# Patient Record
Sex: FEMALE | Race: WHITE
[De-identification: names, ages, dates, MRNs, and addresses within clinical notes are randomized per-mention and may not be internally consistent; named-entity substitution may affect disease eponyms.]

---

## 2017-05-14 ENCOUNTER — HOSPITAL ENCOUNTER (INPATIENT)
Dept: HOSPITAL 17 - NEPI | Age: 22
LOS: 1 days | Discharge: HOME | DRG: 494 | End: 2017-05-15
Attending: ORTHOPAEDIC SURGERY | Admitting: ORTHOPAEDIC SURGERY
Payer: COMMERCIAL

## 2017-05-14 VITALS
RESPIRATION RATE: 16 BRPM | OXYGEN SATURATION: 100 % | DIASTOLIC BLOOD PRESSURE: 58 MMHG | HEART RATE: 89 BPM | SYSTOLIC BLOOD PRESSURE: 108 MMHG

## 2017-05-14 VITALS
HEART RATE: 92 BPM | RESPIRATION RATE: 17 BRPM | DIASTOLIC BLOOD PRESSURE: 77 MMHG | OXYGEN SATURATION: 99 % | TEMPERATURE: 99.8 F | SYSTOLIC BLOOD PRESSURE: 121 MMHG

## 2017-05-14 VITALS — BODY MASS INDEX: 39.9 KG/M2 | HEIGHT: 66 IN | WEIGHT: 248.24 LBS

## 2017-05-14 VITALS — OXYGEN SATURATION: 98 %
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
## 2017-05-14 DIAGNOSIS — S82.851A: Primary | ICD-10-CM
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
## 2017-05-14 LAB
APTT BLD: 23.3 SEC (ref 24.3–30.1)
BASOPHILS # BLD AUTO: 0.1 TH/MM3 (ref 0–0.2)
BASOPHILS NFR BLD: 0.4 % (ref 0–2)
EOSINOPHIL # BLD: 0.1 TH/MM3 (ref 0–0.4)
EOSINOPHIL NFR BLD: 0.5 % (ref 0–4)
ERYTHROCYTE [DISTWIDTH] IN BLOOD BY AUTOMATED COUNT: 14.5 % (ref 11.6–17.2)
HCT VFR BLD CALC: 36.4 % (ref 35–46)
HEMO FLAGS: (no result)
I-STAT POTASSIUM: 4.2 MMOL/L (ref 3.5–4.9)
I-STAT SODIUM: 140 MMOL/L (ref 138–146)
INR PPP: 1 RATIO
LYMPHOCYTES # BLD AUTO: 1.5 TH/MM3 (ref 1–4.8)
LYMPHOCYTES NFR BLD AUTO: 10.5 % (ref 9–44)
MCH RBC QN AUTO: 26.4 PG (ref 27–34)
MCHC RBC AUTO-ENTMCNC: 33.3 % (ref 32–36)
MCV RBC AUTO: 79.3 FL (ref 80–100)
MONOCYTES NFR BLD: 4.1 % (ref 0–8)
NEUTROPHILS # BLD AUTO: 12 TH/MM3 (ref 1.8–7.7)
NEUTROPHILS NFR BLD AUTO: 84.5 % (ref 16–70)
PLATELET # BLD: 290 TH/MM3 (ref 150–450)
PROTHROMBIN TIME: 10.5 SEC (ref 9.8–11.6)
RBC # BLD AUTO: 4.59 MIL/MM3 (ref 4–5.3)
WBC # BLD AUTO: 14.2 TH/MM3 (ref 4–11)

## 2017-05-14 PROCEDURE — 0SSFXZZ REPOSITION RIGHT ANKLE JOINT, EXTERNAL APPROACH: ICD-10-PCS | Performed by: EMERGENCY MEDICINE

## 2017-05-14 PROCEDURE — 82435 ASSAY OF BLOOD CHLORIDE: CPT

## 2017-05-14 PROCEDURE — 82565 ASSAY OF CREATININE: CPT

## 2017-05-14 PROCEDURE — 27840 TREAT ANKLE DISLOCATION: CPT

## 2017-05-14 PROCEDURE — 84295 ASSAY OF SERUM SODIUM: CPT

## 2017-05-14 PROCEDURE — 94150 VITAL CAPACITY TEST: CPT

## 2017-05-14 PROCEDURE — 85025 COMPLETE CBC W/AUTO DIFF WBC: CPT

## 2017-05-14 PROCEDURE — 86900 BLOOD TYPING SEROLOGIC ABO: CPT

## 2017-05-14 PROCEDURE — C1713 ANCHOR/SCREW BN/BN,TIS/BN: HCPCS

## 2017-05-14 PROCEDURE — 86850 RBC ANTIBODY SCREEN: CPT

## 2017-05-14 PROCEDURE — 84520 ASSAY OF UREA NITROGEN: CPT

## 2017-05-14 PROCEDURE — 73600 X-RAY EXAM OF ANKLE: CPT

## 2017-05-14 PROCEDURE — 76000 FLUOROSCOPY <1 HR PHYS/QHP: CPT

## 2017-05-14 PROCEDURE — 85730 THROMBOPLASTIN TIME PARTIAL: CPT

## 2017-05-14 PROCEDURE — 86901 BLOOD TYPING SEROLOGIC RH(D): CPT

## 2017-05-14 PROCEDURE — G0390 TRAUMA RESPONS W/HOSP CRITI: HCPCS

## 2017-05-14 PROCEDURE — E0113 CRUTCH UNDERARM EACH WOOD: HCPCS

## 2017-05-14 PROCEDURE — 82947 ASSAY GLUCOSE BLOOD QUANT: CPT

## 2017-05-14 PROCEDURE — 99291 CRITICAL CARE FIRST HOUR: CPT

## 2017-05-14 PROCEDURE — 84132 ASSAY OF SERUM POTASSIUM: CPT

## 2017-05-14 PROCEDURE — 85610 PROTHROMBIN TIME: CPT

## 2017-05-14 RX ADMIN — MORPHINE SULFATE PRN MG: 2 INJECTION, SOLUTION INTRAMUSCULAR; INTRAVENOUS at 22:48

## 2017-05-14 RX ADMIN — MORPHINE SULFATE PRN MG: 2 INJECTION, SOLUTION INTRAMUSCULAR; INTRAVENOUS at 20:32

## 2017-05-14 RX ADMIN — MORPHINE SULFATE PRN MG: 2 INJECTION, SOLUTION INTRAMUSCULAR; INTRAVENOUS at 18:00

## 2017-05-14 NOTE — RADRPT
EXAM DATE/TIME:  05/14/2017 13:46 

 

[facility] COMPARISON:     

No previous studies available for comparison.

 

                     

INDICATIONS :     

Trauma alert. Right ankle pain.

                     

 

MEDICAL HISTORY :     

None.          

 

SURGICAL HISTORY :     

None.   

 

ENCOUNTER:     

Initial                                        

 

ACUITY:     

1 day      

 

PAIN SCORE:     

5/10

 

LOCATION:     

Right  ankle.

 

FINDINGS:     

Two view examination was performed of the right ankle. There are bimalleolar displaced fractures with
 dislocation the ankle mortise.  No radiopaque foreign bodies are seen.  Bony mineralization is naomie
l.

 

CONCLUSION:     

Bimalleolar fractures with dislocation.

 

 

 

 

 Roddy Cole MD on May 14, 2017 at 14:33           

Board Certified Radiologist.

 This report was verified electronically.

## 2017-05-14 NOTE — RADRPT
EXAM DATE/TIME:  05/14/2017 13:59 

 

[facility] COMPARISON:     

No previous studies available for comparison.

 

                     

INDICATIONS :     

Trauma alert. Post reduction right ankle.

                     

 

MEDICAL HISTORY :     

None.          

 

SURGICAL HISTORY :     

None.   

 

ENCOUNTER:     

Initial                                        

 

ACUITY:     

1 day      

 

PAIN SCORE:     

5/10

 

LOCATION:     

Right  ankle.

 

FINDINGS:     

Two view examination was performed of the right ankle. Postreduction splinted views demonstrates sign
ificant improvement in alignment. Bimalleolar fractures with mild displacement. Ankle mortise near-an
atomic.  

 

CONCLUSION:     

Postreduction splinted views with significant improvement in alignment.

 

 

 

 

 Roddy Cole MD on May 14, 2017 at 14:34           

Board Certified Radiologist.

 This report was verified electronically.

## 2017-05-14 NOTE — PD.CAR.PN
CVT Progress Note


Subjective/Hospital Course:


Patient is a 22-year-old female who slid down a incline obstacle course wall 

from about 10 feet while holding onto a rope.


Incline was such that the patient actually didn't fall but slipped down the 

slippery obstacle and landed on her right ankle


For some reason this was called as a trauma alert but by any mechanism of 

injury or injuries present he did not qualify for the same.


Patient has isolated closed right ankle fracture and splint is applied


Physical examination reveals right foot in a splint warm well perfused with 

normal capillary refill


No vascular or neurologic deficit


Patient is downgraded to regular ER evaluation and care


Thanks


Isaías Arredondo MD May 14, 2017 14:35

## 2017-05-14 NOTE — PD
HPI


Chief Complaint:  Trauma (Alert)


Time Seen by Provider:  13:58


Travel History


International Travel<30 days:  No


Contact w/Intl Traveler<30days:  No


Traveled to known affect area:  No





History of Present Illness


HPI


Young female patient was brought in as a trauma alert.  I was present in the 

room prior to her arrival.  Patient was in an obstacle course climbing a 

slanted wall when she lost control and slid on the wall.  She landed on the 

ground awkwardly on her right ankle/foot twisted.  She was unable to stand up 

and was on severe pain.  Initially when paramedics arrived they could not feel 

a pulse.  Her initial blood pressure was in the 90s.  They called a trauma 

alert based on those 2 criteria.  She did not hit her head and did not lose 

consciousness.  No other injuries.  She was awake, GCS of 15 and 

hemodynamically stable.  Otherwise a healthy patient she said.





[facility]


Past Medical History


*** Narrative Medical


No significant past medical history.


Medical History:  Denies Significant Hx





Past Surgical History


Cholecystectomy:  Yes





Social History


Tobacco Use:  No





Allergies-Medications


(Allergen,Severity, Reaction):  


Coded Allergies:  


     Penicillin (Verified  Allergy, Unknown, 5/14/17)


Comments


Unknown


Reported Meds & Prescriptions








Reported Meds & Active Scripts


Active








Narrative Medication


Unknown





Review of Systems


Except as stated in HPI:  all other systems reviewed are Neg





Physical Exam


Narrative


GENERAL: Awake, alert, obese, mild distress


SKIN: Focused skin assessment warm/dry.  Covered in drive moderate


HEAD: Atraumatic. Normocephalic. 


EYES: Pupils equal and round. No scleral icterus. No injection or drainage. 


ENT: No nasal bleeding or discharge.  Mucous membranes pink and moist.


NECK: Trachea midline. No JVD. 


CARDIOVASCULAR: Regular rate and rhythm.  No murmur appreciated.


RESPIRATORY: No accessory muscle use. Clear to auscultation. Breath sounds 

equal bilaterally. 


GASTROINTESTINAL: Abdomen soft, non-tender, nondistended. Hepatic and splenic 

margins not palpable. 


MUSCULOSKELETAL: Right ankle deformity.  No palpable pulse but dopplerable 

pulses present.  Distal neurovascular intact.  No clubbing.  No cyanosis.  No 

edema. 


NEUROLOGICAL: Awake and alert. No obvious cranial nerve deficits.  Motor 

grossly within normal limits. Normal speech.


PSYCHIATRIC: Appropriate mood and affect; insight and judgment normal.





Data


Data


Last Documented VS








Vital Signs








  Date Time  Temp Pulse Resp B/P Pulse Ox O2 Delivery O2 Flow Rate FiO2


 


5/14/17 14:41     98 Nasal Cannula 2.00 











Orders





 Ed Poc Ultrasound (5/14/17 )


I-Stat Profile (5/14/17 14:04)


I-Stat Creatinine (5/14/17 14:04)


Complete Blood Count With Diff (5/14/17 14:04)


Prothrombin Time / Inr (Pt) (5/14/17 14:04)


Act Partial Throm Time (Ptt) (5/14/17 14:04)


Type And Screen (5/14/17 14:04)


Iv Access Insert/Monitor (5/14/17 14:04)


Ecg Monitoring (5/14/17 14:04)


Oximetry (5/14/17 14:04)


Oxygen Administration (5/14/17 14:04)


Ankle, Limited (Ap&Lat) (5/14/17 )


Ankle, Limited (Ap&Lat) (5/14/17 )


Fiberglass Short Leg Splint Ad (5/14/17 )


Fiberglass Sugartong Sp Ad Sl (5/14/17 )


Admit Order (Ed Use Only) (5/14/17 14:51)





Labs





 Laboratory Tests








Test 5/14/17





 14:19


 


White Blood Count 14.2 TH/MM3


 


Red Blood Count 4.59 MIL/MM3


 


Hemoglobin 12.1 GM/DL


 


Bedside Hemoglobin 12.6 G/DL


 


Hematocrit 36.4 %


 


Bedside Hematocrit 37.0 %


 


Mean Corpuscular Volume 79.3 FL


 


Mean Corpuscular Hemoglobin 26.4 PG


 


Mean Corpuscular Hemoglobin 33.3 %





Concent 


 


Red Cell Distribution Width 14.5 %


 


Platelet Count 290 TH/MM3


 


Mean Platelet Volume 8.9 FL


 


Neutrophils (%) (Auto) 84.5 %


 


Lymphocytes (%) (Auto) 10.5 %


 


Monocytes (%) (Auto) 4.1 %


 


Eosinophils (%) (Auto) 0.5 %


 


Basophils (%) (Auto) 0.4 %


 


Neutrophils # (Auto) 12.0 TH/MM3


 


Lymphocytes # (Auto) 1.5 TH/MM3


 


Monocytes # (Auto) 0.6 TH/MM3


 


Eosinophils # (Auto) 0.1 TH/MM3


 


Basophils # (Auto) 0.1 TH/MM3


 


CBC Comment DIFF FINAL 


 


Differential Comment  


 


Prothrombin Time 10.5 SEC


 


Prothromb Time International 1.0 RATIO





Ratio 


 


Activated Partial 23.3 SEC





Thromboplast Time 


 


Bedside Sodium 140 MMOL/L


 


Bedside Potassium 4.2 MMOL/L


 


Bedside Chloride 107 MMOL/L


 


Bedside Blood Urea Nitrogen 12 MG/DL


 


Bedside Creatinine 0.8 MG/DL


 


Bedside Glucose 97 MG/DL


 


Blood Type A NEGATIVE 


 


Antibody Screen NEGATIVE 











[facility]


Medical Screen Exam Complete:  Yes


Emergency Medical Condition:  Yes


Medical Record Reviewed:  Yes


EKG Prior to Arrival:  Yes


Differential Diagnosis


Ankle fracture, ankle dislocation


Narrative Course


2:46 PM the ankle was reduced by me and the splint applied by [employer].  I 

discussed the case with Dr. Florez who is on for orthopedics and he wanted the 

splint to have the cold machine inserted into the splint.  He wanted the 

patient to be nothing by mouth after midnight and would operate tomorrow by Dr. Garcia.  Patient has been informed about this.  She is okay with that.  She is 

currently comfortable.  Trauma surgeon was here and saw the patient and 

downgraded her.  Patient will be admitted to the orthopedic service.


Critical Care Narrative


Aggregate critical care time was 30 minutes. Time to perform other separately 

billable procedures was not  


included in the critical care time. My time did not include minutes spent 

treating any other patients simultaneously or on  


activities that did not directly contribute to the patient's treatment.  





The services I provided to this patient were to treat and/or prevent clinically 

significant deterioration that could result  


in: Trauma alert





I provided critical care services requiring my management, as noted below:


Chart data review, documentation time, medication orders and management, vital 

sign assessments/reviewing monitor data,  


ordering and reviewing lab tests, ordering and interpreting/reviewing x-rays 

and diagnostic studies, care of the patient  


and discussion of the patient with the admitting physicians.


Procedures


**Procedure Narrative**


Ankle dislocation reduction: The ankle joint was manipulated and reduced by me 

and brought back to anatomical alignment.  Patient tolerated the procedure 

well.  Splint was applied by the [employer].





Physician Communication


Dr. Quinteros, Dr. Florez





Diagnosis


Diagnosis:  


 Primary Impression:  


 Fall


 Qualified Code:  W19.XXXA - Fall, initial encounter


 Additional Impressions:  


 Ankle dislocation


 Qualified Code:  S93.04XA - Ankle dislocation, right, initial encounter


 Ankle fracture, right


 Qualified Code:  S82.891A - Ankle fracture, right, closed, initial encounter


Admitting Physician Requests:  Admit


Scripts


Hydrocodone-Acetaminophen (Norco)7.5-325 mg Tab1 Tab PO Q4H PRN (PAIN) #60 TAB  

Ref 0


   Prov:Moises Randall         5/15/17








Gina Carney MD May 14, 2017 14:50

## 2017-05-15 VITALS
RESPIRATION RATE: 18 BRPM | TEMPERATURE: 96.6 F | HEART RATE: 58 BPM | SYSTOLIC BLOOD PRESSURE: 109 MMHG | OXYGEN SATURATION: 99 % | DIASTOLIC BLOOD PRESSURE: 71 MMHG

## 2017-05-15 VITALS
OXYGEN SATURATION: 98 % | TEMPERATURE: 97 F | RESPIRATION RATE: 18 BRPM | HEART RATE: 87 BPM | SYSTOLIC BLOOD PRESSURE: 120 MMHG | DIASTOLIC BLOOD PRESSURE: 78 MMHG

## 2017-05-15 VITALS
OXYGEN SATURATION: 99 % | DIASTOLIC BLOOD PRESSURE: 59 MMHG | RESPIRATION RATE: 17 BRPM | HEART RATE: 93 BPM | SYSTOLIC BLOOD PRESSURE: 121 MMHG | TEMPERATURE: 98.6 F

## 2017-05-15 VITALS
TEMPERATURE: 98.4 F | OXYGEN SATURATION: 100 % | HEART RATE: 84 BPM | DIASTOLIC BLOOD PRESSURE: 55 MMHG | SYSTOLIC BLOOD PRESSURE: 112 MMHG | RESPIRATION RATE: 16 BRPM

## 2017-05-15 VITALS
HEART RATE: 88 BPM | DIASTOLIC BLOOD PRESSURE: 72 MMHG | TEMPERATURE: 97.7 F | OXYGEN SATURATION: 97 % | RESPIRATION RATE: 18 BRPM | SYSTOLIC BLOOD PRESSURE: 139 MMHG

## 2017-05-15 VITALS — OXYGEN SATURATION: 97 %

## 2017-05-15 PROCEDURE — 0QSJ04Z REPOSITION RIGHT FIBULA WITH INTERNAL FIXATION DEVICE, OPEN APPROACH: ICD-10-PCS | Performed by: ORTHOPAEDIC SURGERY

## 2017-05-15 PROCEDURE — 0QSG04Z REPOSITION RIGHT TIBIA WITH INTERNAL FIXATION DEVICE, OPEN APPROACH: ICD-10-PCS | Performed by: ORTHOPAEDIC SURGERY

## 2017-05-15 RX ADMIN — MORPHINE SULFATE PRN MG: 2 INJECTION, SOLUTION INTRAMUSCULAR; INTRAVENOUS at 00:56

## 2017-05-15 RX ADMIN — HYDROCODONE BITARTRATE AND ACETAMINOPHEN PRN TAB: 7.5; 325 TABLET ORAL at 16:41

## 2017-05-15 RX ADMIN — MORPHINE SULFATE PRN MG: 2 INJECTION, SOLUTION INTRAMUSCULAR; INTRAVENOUS at 04:24

## 2017-05-15 RX ADMIN — HYDROCODONE BITARTRATE AND ACETAMINOPHEN PRN TAB: 7.5; 325 TABLET ORAL at 10:17

## 2017-05-15 NOTE — PD.ORT.PN
Subjective


Subjective Remarks


s/p fall while doing a mud run


right ankle pain. no other complaints.





Objective


Vitals





 Vital Signs








  Date Time  Temp Pulse Resp B/P Pulse Ox O2 Delivery O2 Flow Rate FiO2


 


5/15/17 04:35 98.6 93 17 121/59 99   


 


5/15/17 00:25 98.4 84 16 112/55 100   


 


5/14/17 20:05 99.8 92 17 121/77 99   


 


5/14/17 16:33  89 16 108/58 100   


 


5/14/17 14:41     98 Nasal Cannula 2.00 


 


5/14/17 14:30     98  2.00 








 I/O








 5/14/17 5/14/17 5/14/17 5/15/17 5/15/17 5/15/17





 07:00 15:00 23:00 07:00 15:00 23:00


 


Intake Total   480 ml   


 


Balance   480 ml   


 


      


 


Intake Oral   480 ml   


 


# Voids   1   


 


# Bowel Movements   0   








Result Diagram:  


5/14/17 1419





Other Results





Laboratory Tests








Test 5/14/17





 14:19


 


Prothrombin Time 10.5 SEC





 (9.8-11.6)


 


Prothromb Time International 1.0 RATIO





Ratio 








Objective Remarks


RLE: +short leg splint. intact. NVI. no pain in knee or hip





Assessment & Plan


Assessment and Plan


1) Right Trimalleolar Ankle Fx


   -NPO


   -Consents


   -surgery today








Moises Randall May 15, 2017 06:31

## 2017-05-15 NOTE — PD.OP
cc:   Owen Garcia MD


__________________________________________________





Operative Report


Date of Surgery:  May 15, 2017


Preoperative Diagnosis:  


Displaced right ankle trimalleolar fracture


Postoperative Diagnosis:  


Procedure:


Open reduction internal fixation right ankle trimalleolar fracture, stress exam 

syndesmosis, open reduction internal fixation right ankle syndesmosis


Surgeon:


Owen Garcia


Assistant(s):


MARISSA Christy PA-C


 


The surgical procedure was assisted by my physician assistant. My P.A. presence 

was necessary throughout this case for the manipulation and positioning of the 

surgical extremity. My P.A. was assisting me throughout the duration of this 

procedure. The skill set of a physician assistant was medically necessary to 

complete this procedure. During the surgical case the surgical tech was working 

at the back table and the physician assistant was directly assisting me.


Operation and Findings:


Patient was seen and evaluated preoperatively and found to have a displaced 

right trimalleolar ankle fracture.  Informed consent was obtained after a 

detailed discussion of risk and benefits of surgery.  The operative site was 

marked.  Patient was brought to the OR, placed on the OR table, and given IV 

sedation and general endotracheal anesthesia.  IV antibiotics were given 

preoperatively.  A timeout procedure was performed.  The left leg was prepped 

with alcohol followed by Hibiclens and draped in the usual sterile fashion.  





Attention was turned towards the distal fibula.  A four-inch incision was made 

over the distal fibula.  The subcutaneous tissue was dissected with Bovie. The 

fracture site was visualized.  The fracture site was cleaned with curets. The 

fracture was now reduced.  The fracture keyed into anatomic alignment. K-wires 

were used to h old provisional fixation.  A lag screw was placed to compress 

fracture.  An ITS fibula plate was selected.  The plate was provisionally held 

to bone with K-wires.  3.5 cortical screws were used to compress the plate to 

bone. Multiple screws were placed above and below the fracture.


 


Next attention was turned towards the medial malleolus.  The medial malleolus 

supposed through a 3 cm incision.  Saphenous vein was retracted.  Fracture was 

visualized.  Fracture was cleaned with curettes.  Fracture was now reduced and 

keyed into anatomic alignment.  K wires were used to hold provisional fixation.

  2 guidepins for the 4.0 cannulated screws were placed in a retrograde fashion 

across the fracture.  Fluoroscopy was used to confirm guidepin placement.  

Cannulated drill was placed over the guidepin.  2 appropriate length screws 

were now placed.  Good compression was applied.  Fluoroscopy confirmed well 

aligned fracture with well-placed hardware.





Next, attention was turned to the syndesmosis.  The syndesmosis was stressed. 

There was clear widening of the syndesmosis with external rotation of the 

ankle.  The syndesmosis was now held in a reduced position with the ankle in 

neutral position.  Two 3.5 cortical screws were now placed through the fibula 

plate into the tibia.  Fluoroscopy confirmed appropriate screw placement with 

well-aligned syndesmosis.  Incisions were thoroughly irrigated.  The 

subcutaneous tissue was closed with 3-0 PDS and the skin was closed with 3-0 

nylon.  Sterile dressings were applied.  A well molded well-padded splint was 

applied.  The patient was transferred to Recovery in stable condition.  Needle 

and sponge counts were correct.








Owen Garcia MD May 15, 2017 08:44

## 2017-05-15 NOTE — RADRPT
EXAM DATE/TIME:  05/15/2017 08:26 

 

[facility] COMPARISON:     

ANKLE RIGHT LIMITED (AP&LAT), May 14, 2017, 13:59.

 

                     

INDICATIONS :     

ORIF right ankle.

                     

 

MEDICAL HISTORY :     

None.          

 

SURGICAL HISTORY :     

None.   

 

ENCOUNTER:     

Subsequent                                        

 

ACUITY:     

2 days      

 

PAIN SCORE:     

Non-responsive.

 

LOCATION:     

Right  ankle.

 

FINDINGS:     

Status post internal fixation at the ankle. There is good position and alignment of the fracture frag
ments. The hardware is grossly intact. There is good alignment the mortise joint.

 

CONCLUSION:     

Good position and alignment on this postoperative study.

 

 

 

 

 Galileo Howard MD on May 15, 2017 at 11:06           

Board Certified Radiologist.

 This report was verified electronically.

## 2017-05-15 NOTE — MB
cc:

CHANEL PIZARRO

****

 

 

AKA:  Marianna Redmond

 

DATE OF CONSULTATION:  5/15/2017

 

REASON FOR CONSULTATION

Right ankle fracture-dislocation.

 

HISTORY OF PRESENT ILLNESS

This patient known as Marianna Redmond, whose real name is Vinny Beckford,

was doing an obstacle course race.  She was climbing a slanted wall.  She lost

control and fell.  She hit the ground and landed on her right ankle.  She had

immediate right ankle pain and deformity.  She presented to the emergency room

as a Trauma Alert.  Her only complaint is her right ankle.  Pain is worse with

movement and is improved with rest.  X-rays in the emergency department

revealed a fracture-dislocation of the right ankle.  She underwent closed

reduction in the emergency department.  She denies any other injuries.  She had

no loss of consciousness.

 

PAST MEDICAL HISTORY

 

SURGERIES

Cholecystectomy.

 

ALLERGIES

No known drug allergies.

 

MEDICATIONS

Please see EMR for complete list of inpatient medications.

 

ILLNESSES

None.

 

SOCIAL HISTORY

The patient denies alcohol, tobacco or drug use.

 

REVIEW OF SYSTEMS

The patient denies headache, visual changes, neck pain, chest pain, shortness

of breath, abdominal pain, nausea, vomiting or recent weight loss.  She

complains of right ankle pain.  Pain is worse with movement.

 

PHYSICAL EXAMINATION

GENERAL:  The patient is a pleasant 22-year-old female who is awake and alert.

She appears well-developed, well-nourished.  She is alert and oriented.

HEAD:  The patient is normocephalic.  Pupils are equal.

NECK:  Soft and nontender.  Trachea is midline.

ABDOMEN:  Soft, nontender, nondistended.

EXTREMITIES:  Examination of bilateral upper extremities reveals no significant

pain with shoulder, elbow or wrist motion bilaterally.  She has intact

sensation in all fingers.  Radial pulses are palpable.  She has +5 

strength bilaterally.  She does have some bruises on her right arm.

Examination of left leg reveals no pain with hip, knee or ankle motion.  Skin

is intact.  Dorsalis pedis pulse is palpable.  Sensation is intact.

Examination of right leg reveals no pain with hip or knee motion.  She is

diffusely tender around the ankle.  She is in a well-padded splint.  She has

good capillary refill in her toes.  Sensation is intact in her toes.

 

X-RAYS

X-rays of right ankle were reviewed.  The patient initially had a dislocated

trimalleolar right ankle fracture.  Post-reduction x-rays reveal reasonable

alignment of the ankle.

 

IMPRESSION

Right ankle fracture-dislocation.

 

PLAN

The treatment options were discussed with the patient.  At this point I would

recommend open reduction, internal fixation of right ankle.  The risks of

surgery include bleeding, infection, injuries to arteries, nerves and blood

vessels, nonunion, malunion, painful hardware, as well as medical complications

associated with anesthesia.  I also explained that she will likely have some

stiffness and loss of motion of her ankle.  She could also develop

posttraumatic arthritis.  All questions were answered.  I will plan on surgery

today.

 

A mid-level provider in my office, nurse practitioner or PA, may see this

patient on a follow-up basis and continue to implement the objective of this

plan including: Starting or adjusting medications, injections of muscle,

tendon, bursa or joints, cast application, orthotic or brace application,

physical therapy, further radiographic studies including x-ray, MRI, CT,

ultrasounds or bone scan, vascular studies, neurologic studies, or other

specialist consultations, and proceeding with surgical management as

appropriate.

 

 

 

                              _________________________________

                              MD MICKEY Keller/SHARA

D:  5/15/2017/6:56 AM

T:  5/15/2017/7:09 AM

Visit #:  [id number]

Job #:  [id number]

## 2017-06-08 NOTE — PD.ORT.PN
Subjective


Subjective Remarks


patient doing well status post surgery


Progressing well with physical therapy





Objective


Objective Remarks


right lower extremity: Short leg splint intact with no drainage.  Intact 

sensation in all toes.  Good capillary refills.





Assessment & Plan


Assessment and Plan


 Right Trimalleolar Ankle Fx


patient progressing well status post surgical intervention for right ankle 

fracture


Pain controlled


Nonweightbearing right lower extremity


Discharge to home


Maintain splint, elevate as needed to decrease swelling


Follow-up with Dr. Freitas or PA in 2 weeks








Narayan Hunter Jr. Jun 8, 2017 14:01

## 2018-11-29 ENCOUNTER — APPOINTMENT (RX ONLY)
Dept: URBAN - METROPOLITAN AREA CLINIC 168 | Facility: CLINIC | Age: 23
Setting detail: DERMATOLOGY
End: 2018-11-29

## 2018-11-29 ENCOUNTER — APPOINTMENT (RX ONLY)
Dept: URBAN - METROPOLITAN AREA CLINIC 77 | Facility: CLINIC | Age: 23
Setting detail: DERMATOLOGY
End: 2018-11-29

## 2018-11-29 ENCOUNTER — APPOINTMENT (OUTPATIENT)
Age: 23
Setting detail: DERMATOLOGY
End: 2018-11-29

## 2018-11-29 DIAGNOSIS — D18.0 HEMANGIOMA: ICD-10-CM

## 2018-11-29 DIAGNOSIS — D485 NEOPLASM OF UNCERTAIN BEHAVIOR OF SKIN: ICD-10-CM

## 2018-11-29 PROBLEM — D18.01 HEMANGIOMA OF SKIN AND SUBCUTANEOUS TISSUE: Status: ACTIVE | Noted: 2018-11-29

## 2018-11-29 PROBLEM — D48.5 NEOPLASM OF UNCERTAIN BEHAVIOR OF SKIN: Status: ACTIVE | Noted: 2018-11-29

## 2018-11-29 PROBLEM — L70.0 ACNE VULGARIS: Status: ACTIVE | Noted: 2018-11-29

## 2018-11-29 PROCEDURE — ? BIOPSY BY SHAVE METHOD

## 2018-11-29 PROCEDURE — 99202 OFFICE O/P NEW SF 15 MIN: CPT | Mod: 25

## 2018-11-29 PROCEDURE — 11101: CPT

## 2018-11-29 PROCEDURE — ? COUNSELING

## 2018-11-29 PROCEDURE — 11100: CPT

## 2018-11-29 ASSESSMENT — LOCATION SIMPLE DESCRIPTION DERM
LOCATION SIMPLE: LEFT FOREHEAD
LOCATION SIMPLE: RIGHT ANTERIOR AXILLA
LOCATION SIMPLE: RIGHT POSTERIOR AXILLA
LOCATION SIMPLE: RIGHT ANTERIOR AXILLA
LOCATION SIMPLE: RIGHT POSTERIOR AXILLA
LOCATION SIMPLE: RIGHT ANTERIOR AXILLA
LOCATION SIMPLE: LEFT FOREHEAD
LOCATION SIMPLE: RIGHT POSTERIOR AXILLA
LOCATION SIMPLE: LEFT FOREHEAD

## 2018-11-29 ASSESSMENT — LOCATION DETAILED DESCRIPTION DERM
LOCATION DETAILED: RIGHT ANTERIOR AXILLA
LOCATION DETAILED: RIGHT ANTERIOR AXILLA
LOCATION DETAILED: RIGHT POSTERIOR AXILLA
LOCATION DETAILED: LEFT SUPERIOR FOREHEAD
LOCATION DETAILED: LEFT SUPERIOR FOREHEAD
LOCATION DETAILED: RIGHT POSTERIOR AXILLA
LOCATION DETAILED: RIGHT POSTERIOR AXILLA
LOCATION DETAILED: RIGHT ANTERIOR AXILLA
LOCATION DETAILED: LEFT SUPERIOR FOREHEAD

## 2018-11-29 ASSESSMENT — LOCATION ZONE DERM
LOCATION ZONE: FACE
LOCATION ZONE: AXILLAE
LOCATION ZONE: FACE
LOCATION ZONE: FACE

## 2018-11-29 NOTE — PROCEDURE: BIOPSY BY SHAVE METHOD
Bill [id number] For Specimen Handling/Conveyance To Laboratory?: no
Cryotherapy Text: The wound bed was treated with cryotherapy after the biopsy was performed.
Curettage Text: The wound bed was treated with curettage after the biopsy was performed.
Was A Bandage Applied: Yes
Electrodesiccation And Curettage Text: The wound bed was treated with electrodesiccation and curettage after the biopsy was performed.
Silver Nitrate Text: The wound bed was treated with silver nitrate after the biopsy was performed.
Type Of Destruction Used: Curettage
Biopsy Type: H and E
Detail Level: Detailed
Size Of Lesion In Cm: 0
Billing Type: [employer]
Hemostasis: Drysol
Electrodesiccation Text: The wound bed was treated with electrodesiccation after the biopsy was performed.
Biopsy Method: scissors
Notification Instructions: Pt may call office in 1 to 2 weeks for biopsy results.  If treatment is needed, pt will be notified of biopsy results
Post-Care Instructions: I reviewed with the patient in detail post-care instructions. Patient is to keep the biopsy site dry overnight, and then apply bacitracin twice daily until healed. Patient may apply hydrogen peroxide soaks to remove any crusting.
Anesthesia Volume In Cc (Will Not Render If 0): 0.4
Consent: Written consent was obtained and risks were reviewed including but not limited to scarring, infection, bleeding, scabbing, incomplete removal, nerve damage and allergy to anesthesia.
Dressing: bandage
Body Location Override (Optional - Billing Will Still Be Based On Selected Body Map Location If Applicable): right axilla superior
Anesthesia Type: 1% lidocaine without epinephrine
Depth Of Biopsy: dermis
Wound Care: Polysporin ointment
Notification Instructions: Pt may call office in 1 to 2 weeks for biopsy results. If treatment is needed, pt will be notified of biopsy results
Anesthesia Volume In Cc (Will Not Render If 0): 0.5
Billing Type: Third-Party Bill
Body Location Override (Optional - Billing Will Still Be Based On Selected Body Map Location If Applicable): right axilla inferior

## 2018-11-29 NOTE — PROCEDURE: MIPS QUALITY
Quality 110: Preventive Care And Screening: Influenza Immunization: Influenza Immunization not Administered because Patient Refused.
Quality 226: Preventive Care And Screening: Tobacco Use: Screening And Cessation Intervention: Patient screened for tobacco use and is an ex/non-smoker
Quality 130: Documentation Of Current Medications In The Medical Record: Current Medications Documented
Detail Level: Detailed
Quality 431: Preventive Care And Screening: Unhealthy Alcohol Use - Screening: Patient screened for unhealthy alcohol use using a single question and scores less than 2 times per year

## 2020-07-02 ENCOUNTER — APPOINTMENT (RX ONLY)
Dept: URBAN - METROPOLITAN AREA CLINIC 168 | Facility: CLINIC | Age: 25
Setting detail: DERMATOLOGY
End: 2020-07-02

## 2020-07-02 ENCOUNTER — APPOINTMENT (OUTPATIENT)
Age: 25
Setting detail: DERMATOLOGY
End: 2020-07-02

## 2020-07-02 ENCOUNTER — APPOINTMENT (RX ONLY)
Dept: URBAN - METROPOLITAN AREA CLINIC 77 | Facility: CLINIC | Age: 25
Setting detail: DERMATOLOGY
End: 2020-07-02

## 2020-07-02 DIAGNOSIS — L70.0 ACNE VULGARIS: ICD-10-CM

## 2020-07-02 PROCEDURE — ? COUNSELING

## 2020-07-02 PROCEDURE — ? PRESCRIPTION

## 2020-07-02 PROCEDURE — 99213 OFFICE O/P EST LOW 20 MIN: CPT

## 2020-07-02 RX ORDER — CLINDAMYCIN PHOSPHATE AND BENZOYL PEROXIDE 1 %-5 %
PEA SIZE KIT TOPICAL QAM
Qty: 1 | Refills: 3 | Status: ERX | COMMUNITY
Start: 2020-07-02

## 2020-07-02 RX ADMIN — CLINDAMYCIN PHOSPHATE AND BENZOYL PEROXIDE PEA SIZE: KIT at 00:00

## 2020-07-02 ASSESSMENT — LOCATION DETAILED DESCRIPTION DERM
LOCATION DETAILED: RIGHT FOREHEAD
LOCATION DETAILED: LEFT FOREHEAD
LOCATION DETAILED: RIGHT FOREHEAD
LOCATION DETAILED: LEFT FOREHEAD
LOCATION DETAILED: RIGHT FOREHEAD
LOCATION DETAILED: LEFT FOREHEAD

## 2020-07-02 ASSESSMENT — LOCATION ZONE DERM
LOCATION ZONE: FACE

## 2020-07-02 ASSESSMENT — LOCATION SIMPLE DESCRIPTION DERM
LOCATION SIMPLE: RIGHT FOREHEAD
LOCATION SIMPLE: LEFT FOREHEAD
LOCATION SIMPLE: RIGHT FOREHEAD
LOCATION SIMPLE: LEFT FOREHEAD
LOCATION SIMPLE: RIGHT FOREHEAD
LOCATION SIMPLE: LEFT FOREHEAD

## 2020-07-02 NOTE — PROCEDURE: COUNSELING
Erythromycin Pregnancy And Lactation Text: This medication is Pregnancy Category B and is considered safe during pregnancy. It is also excreted in breast milk.
Benzoyl Peroxide Pregnancy And Lactation Text: This medication is Pregnancy Category C. It is unknown if benzoyl peroxide is excreted in breast milk.
Topical Sulfur Applications Counseling: Topical Sulfur Counseling: Patient counseled that this medication may cause skin irritation or allergic reactions. In the event of skin irritation, the patient was advised to reduce the amount of the drug applied or use it less frequently. The patient verbalized understanding of the proper use and possible adverse effects of topical sulfur application. All of the patient's questions and concerns were addressed.
Azithromycin Pregnancy And Lactation Text: This medication is considered safe during pregnancy and is also secreted in breast milk.
Topical Sulfur Applications Pregnancy And Lactation Text: This medication is Pregnancy Category C and has an unknown safety profile during pregnancy. It is unknown if this topical medication is excreted in breast milk.
Dapsone Pregnancy And Lactation Text: This medication is Pregnancy Category C and is not considered safe during pregnancy or breast feeding.
High Dose Vitamin A Pregnancy And Lactation Text: High dose vitamin A therapy is contraindicated during pregnancy and breast feeding.
Bactrim Pregnancy And Lactation Text: This medication is Pregnancy Category D and is known to cause fetal risk. It is also excreted in breast milk.
Isotretinoin Pregnancy And Lactation Text: This medication is Pregnancy Category X and is considered extremely dangerous during pregnancy. It is unknown if it is excreted in breast milk.
Spironolactone Counseling: Patient advised regarding risks of diarrhea, abdominal pain, hyperkalemia, birth defects (for female patients), liver toxicity and renal toxicity. The patient may need blood work to monitor liver and kidney function and potassium levels while on therapy. The patient verbalized understanding of the proper use and possible adverse effects of spironolactone. All of the patient's questions and concerns were addressed.
Topical Clindamycin Counseling: Patient counseled that this medication may cause skin irritation or allergic reactions. In the event of skin irritation, the patient was advised to reduce the amount of the drug applied or use it less frequently. The patient verbalized understanding of the proper use and possible adverse effects of clindamycin. All of the patient's questions and concerns were addressed.
Sarecycline Counseling: Patient advised regarding possible photosensitivity and discoloration of the teeth, skin, lips, tongue and gums. Patient instructed to avoid sunlight, if possible. When exposed to sunlight, patients should wear protective clothing, sunglasses, and sunscreen. The patient was instructed to call the office immediately if the following severe adverse effects occur:  hearing changes, easy bruising/bleeding, severe headache, or vision changes. The patient verbalized understanding of the proper use and possible adverse effects of sarecycline. All of the patient's questions and concerns were addressed.
Birth Control Pills Pregnancy And Lactation Text: This medication should be avoided if pregnant and for the first 30 days post-partum.
Erythromycin Counseling:  I discussed with the patient the risks of erythromycin including but not limited to GI upset, allergic reaction, drug rash, diarrhea, increase in liver enzymes, and yeast infections.
Sarecycline Pregnancy And Lactation Text: This medication is Pregnancy Category D and not consider safe during pregnancy. It is also excreted in breast milk.
Tazorac Counseling:  Patient advised that medication is irritating and drying. Patient may need to apply sparingly and wash off after an hour before eventually leaving it on overnight. The patient verbalized understanding of the proper use and possible adverse effects of tazorac. All of the patient's questions and concerns were addressed.
Birth Control Pills Counseling: Birth Control Pill Counseling: I discussed with the patient the potential side effects of OCPs including but not limited to increased risk of stroke, heart attack, thrombophlebitis, deep venous thrombosis, hepatic adenomas, breast changes, GI upset, headaches, and depression. The patient verbalized understanding of the proper use and possible adverse effects of OCPs. All of the patient's questions and concerns were addressed.
Use Enhanced Medication Counseling?: No
Topical Clindamycin Pregnancy And Lactation Text: This medication is Pregnancy Category B and is considered safe during pregnancy. It is unknown if it is excreted in breast milk.
Azithromycin Counseling:  I discussed with the patient the risks of azithromycin including but not limited to GI upset, allergic reaction, drug rash, diarrhea, and yeast infections.
Isotretinoin Counseling: Patient should get monthly blood tests, not donate blood, not drive at night if vision affected, not share medication, and not undergo elective surgery for 6 months after tx completed. Side effects reviewed, pt to contact office should one occur.
Tazorac Pregnancy And Lactation Text: This medication is not safe during pregnancy. It is unknown if this medication is excreted in breast milk.
Minocycline Counseling: Patient advised regarding possible photosensitivity and discoloration of the teeth, skin, lips, tongue and gums. Patient instructed to avoid sunlight, if possible. When exposed to sunlight, patients should wear protective clothing, sunglasses, and sunscreen. The patient was instructed to call the office immediately if the following severe adverse effects occur:  hearing changes, easy bruising/bleeding, severe headache, or vision changes. The patient verbalized understanding of the proper use and possible adverse effects of minocycline. All of the patient's questions and concerns were addressed.
Detail Level: Simple
Topical Retinoid Pregnancy And Lactation Text: This medication is Pregnancy Category C. It is unknown if this medication is excreted in breast milk.
Topical Retinoid counseling:  Patient advised to apply a pea-sized amount only at bedtime and wait 30 minutes after washing their face before applying. If too drying, patient may add a non-comedogenic moisturizer. The patient verbalized understanding of the proper use and possible adverse effects of retinoids. All of the patient's questions and concerns were addressed.
Dapsone Counseling: I discussed with the patient the risks of dapsone including but not limited to hemolytic anemia, agranulocytosis, rashes, methemoglobinemia, kidney failure, peripheral neuropathy, headaches, GI upset, and liver toxicity. Patients who start dapsone require monitoring including baseline LFTs and weekly CBCs for the first month, then every month thereafter. The patient verbalized understanding of the proper use and possible adverse effects of dapsone. All of the patient's questions and concerns were addressed.
Benzoyl Peroxide Counseling: Patient counseled that medicine may cause skin irritation and bleach clothing. In the event of skin irritation, the patient was advised to reduce the amount of the drug applied or use it less frequently. The patient verbalized understanding of the proper use and possible adverse effects of benzoyl peroxide. All of the patient's questions and concerns were addressed.
Doxycycline Counseling:  Patient counseled regarding possible photosensitivity and increased risk for sunburn. Patient instructed to avoid sunlight, if possible. When exposed to sunlight, patients should wear protective clothing, sunglasses, and sunscreen. The patient was instructed to call the office immediately if the following severe adverse effects occur:  hearing changes, easy bruising/bleeding, severe headache, or vision changes. The patient verbalized understanding of the proper use and possible adverse effects of doxycycline. All of the patient's questions and concerns were addressed.
Bactrim Counseling:  I discussed with the patient the risks of sulfa antibiotics including but not limited to GI upset, allergic reaction, drug rash, diarrhea, dizziness, photosensitivity, and yeast infections. Rarely, more serious reactions can occur including but not limited to aplastic anemia, agranulocytosis, methemoglobinemia, blood dyscrasias, liver or kidney failure, lung infiltrates or desquamative/blistering drug rashes.
Tetracycline Counseling: Patient counseled regarding possible photosensitivity and increased risk for sunburn. Patient instructed to avoid sunlight, if possible. When exposed to sunlight, patients should wear protective clothing, sunglasses, and sunscreen. The patient was instructed to call the office immediately if the following severe adverse effects occur:  hearing changes, easy bruising/bleeding, severe headache, or vision changes. The patient verbalized understanding of the proper use and possible adverse effects of tetracycline. All of the patient's questions and concerns were addressed. Patient understands to avoid pregnancy while on therapy due to potential birth defects.
Doxycycline Pregnancy And Lactation Text: This medication is Pregnancy Category D and not consider safe during pregnancy. It is also excreted in breast milk but is considered safe for shorter treatment courses.
High Dose Vitamin A Counseling: Side effects reviewed, pt to contact office should one occur.
Spironolactone Pregnancy And Lactation Text: This medication can cause feminization of the male fetus and should be avoided during pregnancy. The active metabolite is also found in breast milk.

## 2020-07-07 ENCOUNTER — RX ONLY (RX ONLY)
Age: 25
End: 2020-07-07

## 2020-07-07 ENCOUNTER — RX ONLY (OUTPATIENT)
Age: 25
Setting detail: RX ONLY
End: 2020-07-07

## 2020-07-07 RX ORDER — CLINDAMYCIN PHOSPHATE 10 MG/ML
THIN LAYER LOTION TOPICAL QAM
Qty: 1 | Refills: 2 | Status: ERX | COMMUNITY
Start: 2020-07-07